# Patient Record
Sex: FEMALE | Race: WHITE | URBAN - METROPOLITAN AREA
[De-identification: names, ages, dates, MRNs, and addresses within clinical notes are randomized per-mention and may not be internally consistent; named-entity substitution may affect disease eponyms.]

---

## 2022-04-25 ENCOUNTER — OFFICE VISIT (OUTPATIENT)
Dept: URBAN - METROPOLITAN AREA CLINIC 51 | Facility: CLINIC | Age: 32
End: 2022-04-25
Payer: COMMERCIAL

## 2022-04-25 DIAGNOSIS — H52.13 MYOPIA, BILATERAL: Primary | ICD-10-CM

## 2022-04-25 DIAGNOSIS — H02.422 MYOGENIC PTOSIS OF THE LEFT EYE: ICD-10-CM

## 2022-04-25 DIAGNOSIS — H04.123 TEAR FILM INSUFFICIENCY OF BILATERAL LACRIMAL GLANDS: ICD-10-CM

## 2022-04-25 PROCEDURE — 92004 COMPRE OPH EXAM NEW PT 1/>: CPT | Performed by: OPTOMETRIST

## 2022-04-25 ASSESSMENT — VISUAL ACUITY
OS: 20/20
OD: 20/20

## 2022-04-25 ASSESSMENT — KERATOMETRY
OD: 42.88
OS: 43.08

## 2022-04-25 ASSESSMENT — INTRAOCULAR PRESSURE
OS: 14
OD: 15

## 2022-04-25 NOTE — IMPRESSION/PLAN
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  Plan: Ed pt that the appearance of one eye eyelid being lower could be the cause of medication measured them today 10 OD 11 OS
will monitor over time

## 2022-04-25 NOTE — IMPRESSION/PLAN
Impression: Tear film insufficiency of bilateral lacrimal glands: H04.123.  Plan: Recommend use Artificial tears QID OU.
take breaks while reading/computer work